# Patient Record
Sex: MALE | Race: BLACK OR AFRICAN AMERICAN | Employment: FULL TIME | URBAN - METROPOLITAN AREA
[De-identification: names, ages, dates, MRNs, and addresses within clinical notes are randomized per-mention and may not be internally consistent; named-entity substitution may affect disease eponyms.]

---

## 2017-07-19 ENCOUNTER — APPOINTMENT (OUTPATIENT)
Dept: CT IMAGING | Age: 27
End: 2017-07-19
Payer: COMMERCIAL

## 2017-07-19 ENCOUNTER — HOSPITAL ENCOUNTER (EMERGENCY)
Age: 27
Discharge: HOME | End: 2017-07-20
Attending: EMERGENCY MEDICINE
Payer: COMMERCIAL

## 2017-07-19 ENCOUNTER — APPOINTMENT (OUTPATIENT)
Dept: GENERAL RADIOLOGY | Age: 27
End: 2017-07-19
Payer: COMMERCIAL

## 2017-07-19 VITALS
BODY MASS INDEX: 31.19 KG/M2 | OXYGEN SATURATION: 99 % | HEART RATE: 91 BPM | TEMPERATURE: 98.3 F | SYSTOLIC BLOOD PRESSURE: 152 MMHG | DIASTOLIC BLOOD PRESSURE: 94 MMHG | WEIGHT: 230 LBS | RESPIRATION RATE: 18 BRPM

## 2017-07-19 DIAGNOSIS — S82.892A CLOSED FRACTURE OF ANKLE, LEFT, INITIAL ENCOUNTER: Primary | ICD-10-CM

## 2017-07-19 PROCEDURE — 29515 APPLICATION SHORT LEG SPLINT: CPT

## 2017-07-19 PROCEDURE — 73700 CT LOWER EXTREMITY W/O DYE: CPT

## 2017-07-19 PROCEDURE — 73610 X-RAY EXAM OF ANKLE: CPT

## 2017-07-19 PROCEDURE — 99284 EMERGENCY DEPT VISIT MOD MDM: CPT

## 2017-07-19 PROCEDURE — 6370000000 HC RX 637 (ALT 250 FOR IP): Performed by: EMERGENCY MEDICINE

## 2017-07-19 RX ORDER — IBUPROFEN 800 MG/1
800 TABLET ORAL ONCE
Status: COMPLETED | OUTPATIENT
Start: 2017-07-19 | End: 2017-07-19

## 2017-07-19 RX ADMIN — IBUPROFEN 800 MG: 800 TABLET, FILM COATED ORAL at 21:13

## 2017-07-19 ASSESSMENT — ENCOUNTER SYMPTOMS
EYE REDNESS: 0
EYE DISCHARGE: 0
NAUSEA: 0
COLOR CHANGE: 0
DIARRHEA: 0
RHINORRHEA: 0
SHORTNESS OF BREATH: 0
COUGH: 0
VOMITING: 0
SORE THROAT: 0

## 2017-07-19 ASSESSMENT — PAIN DESCRIPTION - PAIN TYPE: TYPE: ACUTE PAIN

## 2017-07-19 ASSESSMENT — PAIN SCALES - GENERAL
PAINLEVEL_OUTOF10: 9
PAINLEVEL_OUTOF10: 9

## 2017-07-19 ASSESSMENT — PAIN DESCRIPTION - LOCATION: LOCATION: ANKLE

## 2017-07-19 ASSESSMENT — PAIN DESCRIPTION - ORIENTATION: ORIENTATION: LEFT

## 2017-07-20 RX ORDER — HYDROCODONE BITARTRATE AND ACETAMINOPHEN 5; 325 MG/1; MG/1
1 TABLET ORAL EVERY 6 HOURS PRN
Qty: 20 TABLET | Refills: 0 | Status: SHIPPED | OUTPATIENT
Start: 2017-07-20 | End: 2017-07-27

## 2017-07-21 ENCOUNTER — OFFICE VISIT (OUTPATIENT)
Dept: ORTHOPEDIC SURGERY | Age: 27
End: 2017-07-21
Payer: COMMERCIAL

## 2017-07-21 VITALS — WEIGHT: 230 LBS | BODY MASS INDEX: 31.15 KG/M2 | HEIGHT: 72 IN

## 2017-07-21 DIAGNOSIS — S99.912A LEFT ANKLE INJURY, INITIAL ENCOUNTER: Primary | ICD-10-CM

## 2017-07-21 PROCEDURE — 27750 TREATMENT OF TIBIA FRACTURE: CPT | Performed by: ORTHOPAEDIC SURGERY

## 2017-07-21 PROCEDURE — 99203 OFFICE O/P NEW LOW 30 MIN: CPT | Performed by: ORTHOPAEDIC SURGERY

## 2017-12-21 ENCOUNTER — HOSPITAL ENCOUNTER (EMERGENCY)
Age: 27
Discharge: HOME | End: 2017-12-21
Attending: EMERGENCY MEDICINE

## 2017-12-21 ENCOUNTER — APPOINTMENT (OUTPATIENT)
Dept: GENERAL RADIOLOGY | Age: 27
End: 2017-12-21

## 2017-12-21 VITALS
BODY MASS INDEX: 33.86 KG/M2 | OXYGEN SATURATION: 95 % | TEMPERATURE: 98.3 F | SYSTOLIC BLOOD PRESSURE: 146 MMHG | HEIGHT: 72 IN | HEART RATE: 78 BPM | WEIGHT: 250 LBS | RESPIRATION RATE: 18 BRPM | DIASTOLIC BLOOD PRESSURE: 87 MMHG
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
## 2017-12-21 DIAGNOSIS — S13.4XXA WHIPLASH INJURIES, INITIAL ENCOUNTER: ICD-10-CM

## 2017-12-21 DIAGNOSIS — M62.838 SPASM OF MUSCLE: ICD-10-CM

## 2017-12-21 DIAGNOSIS — S39.012A STRAIN OF LUMBAR REGION, INITIAL ENCOUNTER: ICD-10-CM

## 2017-12-21 PROCEDURE — 6370000000 HC RX 637 (ALT 250 FOR IP): Performed by: EMERGENCY MEDICINE

## 2017-12-21 PROCEDURE — 72100 X-RAY EXAM L-S SPINE 2/3 VWS: CPT

## 2017-12-21 PROCEDURE — 96374 THER/PROPH/DIAG INJ IV PUSH: CPT

## 2017-12-21 PROCEDURE — 99283 EMERGENCY DEPT VISIT LOW MDM: CPT

## 2017-12-21 PROCEDURE — 6360000002 HC RX W HCPCS: Performed by: EMERGENCY MEDICINE

## 2017-12-21 RX ORDER — KETOROLAC TROMETHAMINE 30 MG/ML
60 INJECTION, SOLUTION INTRAMUSCULAR; INTRAVENOUS ONCE
Status: COMPLETED | OUTPATIENT
Start: 2017-12-21 | End: 2017-12-21

## 2017-12-21 RX ORDER — METHOCARBAMOL 750 MG/1
750 TABLET, FILM COATED ORAL 4 TIMES DAILY
Qty: 40 TABLET | Refills: 0 | Status: SHIPPED | OUTPATIENT
Start: 2017-12-21 | End: 2017-12-31

## 2017-12-21 RX ORDER — METAXALONE 800 MG/1
800 TABLET ORAL ONCE
Status: COMPLETED | OUTPATIENT
Start: 2017-12-21 | End: 2017-12-21

## 2017-12-21 RX ORDER — IBUPROFEN 800 MG/1
800 TABLET ORAL EVERY 8 HOURS PRN
Qty: 30 TABLET | Refills: 0 | Status: SHIPPED | OUTPATIENT
Start: 2017-12-21

## 2017-12-21 RX ORDER — HYDROCODONE BITARTRATE AND ACETAMINOPHEN 5; 325 MG/1; MG/1
1 TABLET ORAL EVERY 6 HOURS PRN
Qty: 10 TABLET | Refills: 0 | Status: SHIPPED | OUTPATIENT
Start: 2017-12-21 | End: 2017-12-28

## 2017-12-21 RX ADMIN — KETOROLAC TROMETHAMINE 60 MG: 30 INJECTION, SOLUTION INTRAMUSCULAR at 23:43

## 2017-12-21 RX ADMIN — METAXALONE 800 MG: 800 TABLET ORAL at 23:43

## 2017-12-21 ASSESSMENT — PAIN DESCRIPTION - LOCATION: LOCATION: BACK;NECK

## 2017-12-21 ASSESSMENT — PAIN DESCRIPTION - DESCRIPTORS: DESCRIPTORS: ACHING

## 2017-12-21 ASSESSMENT — PAIN DESCRIPTION - ORIENTATION: ORIENTATION: LOWER

## 2017-12-21 ASSESSMENT — PAIN DESCRIPTION - FREQUENCY: FREQUENCY: CONTINUOUS

## 2017-12-21 ASSESSMENT — PAIN DESCRIPTION - PROGRESSION: CLINICAL_PROGRESSION: NOT CHANGED

## 2017-12-21 ASSESSMENT — PAIN DESCRIPTION - PAIN TYPE: TYPE: ACUTE PAIN

## 2017-12-21 ASSESSMENT — PAIN SCALES - GENERAL: PAINLEVEL_OUTOF10: 9
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
## 2018-02-11 ENCOUNTER — HOSPITAL ENCOUNTER (EMERGENCY)
Age: 28
Discharge: HOME | End: 2018-02-11
Attending: EMERGENCY MEDICINE
Payer: COMMERCIAL

## 2018-02-11 VITALS
WEIGHT: 240 LBS | SYSTOLIC BLOOD PRESSURE: 143 MMHG | BODY MASS INDEX: 33.6 KG/M2 | TEMPERATURE: 97.9 F | DIASTOLIC BLOOD PRESSURE: 92 MMHG | HEART RATE: 86 BPM | RESPIRATION RATE: 16 BRPM | OXYGEN SATURATION: 98 % | HEIGHT: 71 IN

## 2018-02-11 DIAGNOSIS — K08.89 PAIN, DENTAL: Primary | ICD-10-CM

## 2018-02-11 PROCEDURE — 99282 EMERGENCY DEPT VISIT SF MDM: CPT

## 2018-02-11 RX ORDER — PENICILLIN V POTASSIUM 500 MG/1
500 TABLET ORAL 4 TIMES DAILY
Qty: 28 TABLET | Refills: 0 | Status: SHIPPED | OUTPATIENT
Start: 2018-02-11 | End: 2018-02-18

## 2018-02-11 ASSESSMENT — PAIN SCALES - GENERAL: PAINLEVEL_OUTOF10: 10

## 2018-02-12 NOTE — ED PROVIDER NOTES
[address] [facility] ED  Emergency Department  Independent Attestation     Pt Name: Patricia Jones  MRN: [id number]  [place] 1990  Date of evaluation: 2/11/18       Patricia Jones is a 32 y.o. male who presents with Dental Pain      I was personally available for consultation in the Emergency Department. I have reviewed the chart and agree with the documentation as recorded by the [facility], including the assessment, treatment plan and disposition.     Cari De Leon DO  Attending Emergency Physician  [address] [facility] ED      (Please note that portions of this note were completed with a voice recognition program.  Efforts were made to edit the dictations but occasionally words are mis-transcribed.)        Cari De Leon DO  02/11/18 2054
inspection, Pupils equal, round and reactive to light. NECK: Normal ROM, No jugular venous distention, No meningeal signs, Cervical spine nontender. MOUTH:  + dental pain at left lower molar, appears to have a cavity , with no signs of abscess formation or Oleksandr sign noted. No swelling involving the airway at all. RESPIRATORY CHEST: No respiratory distress. ABDOMEN: Abdomen is nontender, No distension. UPPER EXTREMITY: Inspection normal, No cyanosis. NEURO: GCS is 15, Speech normal, Memory normal.   SKIN: Skin is warm, Skin is dry. PSYCHIATRIC: Oriented X 3, Normal affect. EMERGENCY DEPARTMENT COURSE:   Pain meds and antibiotic prescriptions. Pt provided with dental clinic list and instructed to call as soon as possible for an appointment. Instructed to return for worsening or any new symptoms including throat swelling, difficulty swallowing or breathing. Pt agrees. FINAL IMPRESSION:     1.  Pain, dental          DISPOSITION:  DISPOSITION Decision To Discharge 02/11/2018 08:49:31 PM        PATIENT REFERRED TO:  your dentist or clinic list    Schedule an appointment as soon as possible for a visit       [facility] ED  [place] [id number] [phone]    For worsening symptoms, or any other concern      DISCHARGE MEDICATIONS:  New Prescriptions    PENICILLIN V POTASSIUM (VEETID) 500 MG TABLET    Take 1 tablet by mouth 4 times daily for 7 days       (Please note that portions of this note were completed with a voice recognition program.  Efforts were made to edit the dictations but occasionally words are mis-transcribed.)    Mary Ann English, [address] [facility], [address] [place] [address]  02/11/18 5763

## 2018-02-12 NOTE — ED NOTES
Pt presents in ED c/o dental pain; ongoing for one week; pt reports cavities that are present on his lower left side; pt does have braces present; no known injuries; pt denies any further complaints. Pt is eupneic, A&OX4, and PWD. Call light in reach.       Jeannie Ruffin RN  02/11/18 2056